# Patient Record
Sex: FEMALE | Race: BLACK OR AFRICAN AMERICAN | NOT HISPANIC OR LATINO | Employment: OTHER | URBAN - METROPOLITAN AREA
[De-identification: names, ages, dates, MRNs, and addresses within clinical notes are randomized per-mention and may not be internally consistent; named-entity substitution may affect disease eponyms.]

---

## 2017-11-14 ENCOUNTER — OFFICE VISIT (OUTPATIENT)
Dept: OPHTHALMOLOGY | Facility: CLINIC | Age: 82
End: 2017-11-14
Payer: MEDICARE

## 2017-11-14 DIAGNOSIS — Z79.4 TYPE 2 DIABETES MELLITUS WITH BOTH EYES AFFECTED BY PROLIFERATIVE RETINOPATHY AND MACULAR EDEMA, WITH LONG-TERM CURRENT USE OF INSULIN: Primary | ICD-10-CM

## 2017-11-14 DIAGNOSIS — E11.3513 TYPE 2 DIABETES MELLITUS WITH BOTH EYES AFFECTED BY PROLIFERATIVE RETINOPATHY AND MACULAR EDEMA, WITH LONG-TERM CURRENT USE OF INSULIN: Primary | ICD-10-CM

## 2017-11-14 PROCEDURE — 92134 CPTRZ OPH DX IMG PST SGM RTA: CPT | Mod: S$GLB,,, | Performed by: OPHTHALMOLOGY

## 2017-11-14 PROCEDURE — 92014 COMPRE OPH EXAM EST PT 1/>: CPT | Mod: S$GLB,,, | Performed by: OPHTHALMOLOGY

## 2017-11-14 PROCEDURE — 99999 PR PBB SHADOW E&M-EST. PATIENT-LVL II: CPT | Mod: PBBFAC,,, | Performed by: OPHTHALMOLOGY

## 2017-11-14 NOTE — PROGRESS NOTES
"    ===============================  11/14/2017   Lupe Turpin,   82 y.o. female   Last visit [facility]: :3/23/2015   Last visit eye dept. Visit date not found  [facility]:  Uncorrected distance visual acuity was 20/80 in the right eye and 20/80 in the left eye.  Tonometry     Tonometry (Applanation, 11:24 AM)       Right Left    Pressure 14 14              Wearing Rx     Wearing Rx       Sphere Cylinder Axis Add    Right +1.25 +1.25 175 +3.00    Left pl +0.50 005 +3.00    Type:  PAL              Manifest Refraction     Manifest Refraction       Sphere Cylinder [place] [facility]    Right +1.25 +1.25 175 20/60    Left [place]   20/80              Chief Complaint   Patient presents with    Diabetic Eye Exam     YEARLY DIABETIC EXAM        HPI     Diabetic Eye Exam    Additional comments: YEARLY DIABETIC EXAM           Comments   DM X 14 YRS  BDR  S/P FOCAL AND AV. OS FOR CSME  PCIOL OS   NS OD  FUCHS  DRY EYES  AVASTIN OD 4/3/14  LUCENTIS OD 4/30/14       Last edited by Marisol Ambrosio on 11/14/2017 11:05 AM. (History)          ________________  11/14/2017  Problem List Items Addressed This Visit        Eye/Vision problems    Type 2 diabetes mellitus with both eyes affected by proliferative retinopathy and macular edema, with long-term current use of insulin - Primary    Relevant Orders    Posterior Segment OCT Retina-Both eyes    Prior Authorization Order          .OD 3+ Nuclear sclerosis with mid cortical lamellar cataract  OS PCIOL  DME worse, symmetric  OS PDR  Poor vision secondary to DME DR  Lost to f/u since 3/2015    Needs serial avgf ou, recommend begin with OS for PDR  RTC for Avastin OS IVFA for PDR OU    Long discussion regarding glasses  "I need glasses so I can see"  NI with MR  Poor vision secondary to DR  H/o noncompliance  And lost to follow up  Keeps asking for glasses  rec otc readers but need to tx dme         ===========================    "

## 2017-12-18 ENCOUNTER — TELEPHONE (OUTPATIENT)
Dept: OPHTHALMOLOGY | Facility: CLINIC | Age: 82
End: 2017-12-18